# Patient Record
(demographics unavailable — no encounter records)

---

## 2025-06-12 NOTE — HISTORY OF PRESENT ILLNESS
[de-identified] : This is a 75 year-old woman experiencing left hip and groin and lateral hip pain, which is severe in intensity. Hx L hip troch bursitis. The pain mildly limits activities of daily living. Walking tolerance is mildly reduced. Had a L hip troch bursa injection 6 weeks ago which helped. Now pain recurrent.

## 2025-06-12 NOTE — PHYSICAL EXAM
[de-identified] : Patient is well nourished, well-developed, in no acute distress, with appropriate mood and affect. The patient is oriented to time, place, and person. Respirations are even and unlabored. Gait evaluation does not reveal a limp. There is no inguinal adenopathy. Examination of the contralateral hip shows normal range of motion, strength, no tenderness, and intact skin. The affected limb is well-perfused and showed 2+ dp/pt pulses, without skin lesions, shows a grossly normal motor and sensory examination. Examination of the hip shows no skin lesions. Hip motion is full and painless from 0-90 degrees extension to flexion, 20 degrees adduction and 20 degrees abduction, and 15 degrees internal and 30 degrees external rotation. Leg lengths are approximately equal. FADIR is negative and PARKER is negative. Stinchfield test is negative. Both hips are stable and muscle strength is normal with good strength with resisted abduction and adduction. Pedal pulses are palpable.  Tender to palpation about the lateral aspect of the hip. [de-identified] : AP pelvis, AP and lateral hip radiographs of the left hip were ordered and taken in the office and demonstrate no evidence of degenerative joint disease of the hip with maintained joint space and no evidence of fractures or other intraarticular pathology.  The patient brings with her an MRI of the left hip.  Reviewed the imaging with the patient was demonstrates left hip trochanteric bursitis.  Mild thinning of the cartilage but no abram osteoarthritis.

## 2025-06-12 NOTE — DISCUSSION/SUMMARY
[de-identified] : This patient has left hip trochanteric bursitis.  The patient is not an appropriate candidate for surgical intervention at this time. An extensive discussion was conducted on the natural history of the disease and the variety of surgical and non-surgical options available to the patient including, but not limited to non-steroidal anti-inflammatory medications, steroid injections, physical therapy, maintenance of ideal body weight, and reduction of activity.  Physical therapy recommended.  Celebrex prescribed.  She is considering more cortisone injections which she will do with Dr. Lim. The patient will schedule an appointment as needed.